# Patient Record
Sex: FEMALE | Race: WHITE | URBAN - METROPOLITAN AREA
[De-identification: names, ages, dates, MRNs, and addresses within clinical notes are randomized per-mention and may not be internally consistent; named-entity substitution may affect disease eponyms.]

---

## 2017-03-17 DIAGNOSIS — H92.10 EAR DRAINAGE, UNSPECIFIED LATERALITY: Primary | ICD-10-CM

## 2017-03-17 RX ORDER — CIPROFLOXACIN AND DEXAMETHASONE 3; 1 MG/ML; MG/ML
SUSPENSION/ DROPS AURICULAR (OTIC)
Qty: 7.5 ML | Refills: 0 | Status: SHIPPED | OUTPATIENT
Start: 2017-03-17 | End: 2017-03-27

## 2017-04-11 ENCOUNTER — OFFICE VISIT (OUTPATIENT)
Dept: OTOLARYNGOLOGY | Facility: CLINIC | Age: 57
End: 2017-04-11

## 2017-04-11 VITALS — BODY MASS INDEX: 24.33 KG/M2 | WEIGHT: 155 LBS | HEIGHT: 67 IN

## 2017-04-11 DIAGNOSIS — H90.6 MIXED HEARING LOSS, BILATERAL: Primary | ICD-10-CM

## 2017-04-11 DIAGNOSIS — H74.13: ICD-10-CM

## 2017-04-11 RX ORDER — CIPROFLOXACIN AND DEXAMETHASONE 3; 1 MG/ML; MG/ML
SUSPENSION/ DROPS AURICULAR (OTIC)
Qty: 7.5 ML | Refills: 0 | Status: SHIPPED | OUTPATIENT
Start: 2017-04-11 | End: 2017-04-21

## 2017-04-11 RX ORDER — CIPROFLOXACIN AND DEXAMETHASONE 3; 1 MG/ML; MG/ML
4 SUSPENSION/ DROPS AURICULAR (OTIC) 2 TIMES DAILY
COMMUNITY

## 2017-04-11 RX ORDER — FLUTICASONE PROPIONATE 50 MCG
2 SPRAY, SUSPENSION (ML) NASAL DAILY
Qty: 1 BOTTLE | Refills: 6 | Status: SHIPPED | OUTPATIENT
Start: 2017-04-11 | End: 2017-05-11

## 2017-04-11 ASSESSMENT — PAIN SCALES - GENERAL: PAINLEVEL: NO PAIN (0)

## 2017-04-11 NOTE — LETTER
4/11/2017       RE: Ora Sadler  [address] [facility] [address]     Dear Colleague,    Thank you for referring your patient, Ora Sadler, to the [facility] EAR NO[facility] AND THROAT at [facility]. Please see a copy of my visit note below.    Dear Dr. Enciso Ref-Primary, Physician:    I had the pleasure of seeing Ora Sadler in followup today at the [facility].    HISTORY OF PRESENT ILLNESS: The patient is a 56-year-old in today for followup from her last visit in May of 2016.  I have been seeing her since 2000 with bilateral cavities from bilateral ear surgeries performed in [place].  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  She has had bilateral cavities.  She comes in for cleaning.  She says she had a recent viral URI in March.  A little bit of discharge on the left with some sore throat.  She was seen in urgent care and given an antibiotic.  Hearing seems muffled.  She is moving back to [place] in the summer.  She feels the hearing is stable, worse on the right.  She does have hearing aids.  There has been no active drainage.         MEDICATIONS: Please refer to the detailed medication reconciliation performed by my nurse today, which I have reviewed and signed.     ALLERGIES:    Allergies   Allergen Reactions     Penicillins        HABITS:   Alcohol use Yes 4.2 oz/week    History   Smoking Status     Never Smoker   Smokeless Tobacco     Never Used         PAST MEDICAL HISTORY:  Please see today's intake form (for the remainder of the PMH) which I reviewed and signed.  Past Medical History:   Diagnosis Date     Conductive hearing loss      Hearing problem        FAMILY HISTORY/SOCIAL HISTORY:    Family History   Problem Relation Age of Onset     CANCER Mother      HEART DISEASE Mother      Hypertension Mother      DIABETES Paternal Grandmother      HEART DISEASE Paternal Grandmother       Hypertension Paternal Grandmother      HEART DISEASE Father     Social History     Social History     Marital status:      Spouse name: N/A     Number of children: N/A     Years of education: N/A     Occupational History     Not on file.     Social History Main Topics     Smoking status: Never Smoker     Smokeless tobacco: Never Used     Alcohol use 4.2 oz/week     Drug use: No     Sexual activity: No     Other Topics Concern     Not on file     Social History Narrative       REVIEW OF SYSTEMS: Please see today's intake form (for the remainder of the ROS) which I have reviewed and signed.    PHYSICIAL EXAMINATION:  Constitutional: The patient was well-groomed and in no acute distress.   Skin: Warm and pink.  Psychiatric: The patient's affect was calm, cooperative, and appropriate.   Respiratory: Breathing comfortably without stridor or exertion of accessory muscles.  Eyes: Pupils were equal and reactive. Extraocular movement intact.   Head: Normocephalic and atraumatic. No lesions or scars.  Ears: Both ears are examined with the microscope, curette, and a right angled hook.  Right side initially was cleaned using all instruments as well as an alligator.  After removal, the tympanic membrane area looks adhesive with the cavity well visualized and stable.  No evidence for recurrent cholesteatoma.  Left side had a lot of crusting which was mobilized with a right hook and removed with an alligator and curette.  The cavity again well visualized with stable tympanic membrane.   Nose: Sinuses were nontender. Anterior rhinoscopy revealed midline septum and absence of purulence or polyps.  Oral Cavity: Normal tongue, floor of moth, buccal mucosa, and palate. No abnormal lymph tissue in the oropharynx.   Neck: The parotid is soft without masses. Supple with normal laryngeal and tracheal landmarks.   Lymphatic: There is no palpable lymphadenopathy or other masses in the neck.   Neurologic: Alert and oriented x 3.  Cranial nerves III-XI within normal limits. Voice quality normal.  Cerebellar Function Tests:  Grossly normal    Audiogram:  None today.  Subjectively she feels fairly stable.         IMPRESSION AND PLAN: I talked with her for some time and ears look stable.  She is moving to [place] and I have given her the name of Dr. Giang if she would like to see an ear specialist there.  I would be happy to see her back here, she does have children in town.         Thank you very much for the opportunity to participate in the care of your patient.    Rick L Nissen MD

## 2017-04-11 NOTE — MR AVS SNAPSHOT
After Visit Summary   4/11/2017    Ora Sadler    MRN: [id number]           Patient Information     Date Of Birth          1960        Visit Information        Provider Department      4/11/2017 10:15 AM Nissen, Rick L, MD [facility]        Today's Diagnoses     CSOM (chronic suppurative otitis media)    -  1      Care Instructions    Two prescriptions were sent to the [facility] [place] [facility] in [facility], [place].     Thank you.        Follow-ups after your visit        Who to contact     Please call your clinic at [phone] to:    Ask questions about your health    Make or cancel appointments    Discuss your medicines    Learn about your test results    Speak to your doctor   If you have compliments or concerns about an experience at your clinic, or if you wish to file a complaint, please contact [facility] Patient Relations at [phone] or email us at [email or web address]         Additional Information About Your Visit        MyChart Information     [facility] gives you secure access to your electronic health record. If you see a primary care provider, you can also send messages to your care team and make appointments. If you have questions, please call your primary care clinic.  If you do not have a primary care provider, please call [phone] and they will assist you.      [employer] is an electronic gateway that provides easy, online access to your medical records. With [employer], you can request a clinic appointment, read your test results, renew a prescription or communicate with your care team.     To access your existing account, please contact your [facility] or call [phone] for assistance.        Care EveryWhere ID     This is your Care EveryWhere ID. This could be used by other organizations to access your [facility] medical records  [id number]        Your Vitals Were     Height BMI (Body Mass Index)     "            1.71 m (5' 7.32\") 24.04 kg/m2           Blood Pressure from Last 3 Encounters:   No data found for BP    Weight from Last 3 Encounters:   04/11/17 70.3 kg (155 lb)              Today, you had the following     No orders found for display         Today's Medication Changes          These changes are accurate as of: 4/11/17 10:45 AM.  If you have any questions, ask your nurse or doctor.               Start taking these medicines.        Dose/Directions    fluticasone 50 MCG/ACT spray   Commonly known as:  FLONASE   Used for:  CSOM (chronic suppurative otitis media)   Started by:  Nissen, Rick L, MD        Dose:  2 spray   Spray 2 sprays into both nostrils daily   Quantity:  1 Bottle   Refills:  6         These medicines have changed or have updated prescriptions.        Dose/Directions    * ciprofloxacin-dexamethasone otic suspension   Commonly known as:  CIPRODEX   This may have changed:  Another medication with the same name was added. Make sure you understand how and when to take each.   Changed by:  Nissen, Rick L, MD        Dose:  4 drop   4 drops 2 times daily   Refills:  0       * ciprofloxacin-dexamethasone otic suspension   Commonly known as:  CIPRODEX   This may have changed:  You were already taking a medication with the same name, and this prescription was added. Make sure you understand how and when to take each.   Used for:  CSOM (chronic suppurative otitis media)   Changed by:  Nissen, Rick L, MD        Instill 3gtts into bilateral ears TID x 3 days, then PRN   Quantity:  7.5 mL   Refills:  0       * Notice:  This list has 2 medication(s) that are the same as other medications prescribed for you. Read the directions carefully, and ask your doctor or other care provider to review them with you.         Where to get your medicines      These medications were sent to [facility] [id number] [facility] [place] [address], [place] [address]     Phone:  [phone]     " ciprofloxacin-dexamethasone otic suspension    fluticasone 50 MCG/ACT spray                Primary Care Provider    Physician No Ref-Primary       No address on file        Thank you!     Thank you for choosing [facility] EAR NOSE [facility] TH[facility]AT  for your care. Our goal is always to provide you with excellent care. Hearing back from our patients is one way we can continue to improve our services. Please take a few minutes to complete the written survey that you may receive in the mail after your visit with us. Thank you!             Your Updated Medication List - Protect others around you: Learn how to safely use, store and throw away your medicines at www.disposemymeds.org.          This list is accurate as of: 4/11/17 10:45 AM.  Always use your most recent med list.                   Brand Name Dispense Instructions for use    * ciprofloxacin-dexamethasone otic suspension    CIPRODEX     4 drops 2 times daily       * ciprofloxacin-dexamethasone otic suspension    CIPRODEX    7.5 mL    Instill 3gtts into bilateral ears TID x 3 days, then PRN       fluticasone 50 MCG/ACT spray    FLONASE    1 Bottle    Spray 2 sprays into both nostrils daily       * Notice:  This list has 2 medication(s) that are the same as other medications prescribed for you. Read the directions carefully, and ask your doctor or other care provider to review them with you.

## 2017-04-11 NOTE — PROGRESS NOTES
Dear Dr. Enciso Ref-Primary, Physician:    I had the pleasure of seeing Ora Sadler in followup today at the [facility].    HISTORY OF PRESENT ILLNESS: The patient is a 56-year-old in today for followup from her last visit in May of 2016.  I have been seeing her since 2000 with bilateral cavities from bilateral ear surgeries performed in [place].  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  She has had bilateral cavities.  She comes in for cleaning.  She says she had a recent viral URI in March.  A little bit of discharge on the left with some sore throat.  She was seen in urgent care and given an antibiotic.  Hearing seems muffled.  She is moving back to [place] in the summer.  She feels the hearing is stable, worse on the right.  She does have hearing aids.  There has been no active drainage.         MEDICATIONS: Please refer to the detailed medication reconciliation performed by my nurse today, which I have reviewed and signed.     ALLERGIES:    Allergies   Allergen Reactions     Penicillins        HABITS:   Alcohol use Yes 4.2 oz/week    History   Smoking Status     Never Smoker   Smokeless Tobacco     Never Used         PAST MEDICAL HISTORY:  Please see today's intake form (for the remainder of the PMH) which I reviewed and signed.  Past Medical History:   Diagnosis Date     Conductive hearing loss      Hearing problem        FAMILY HISTORY/SOCIAL HISTORY:    Family History   Problem Relation Age of Onset     CANCER Mother      HEART DISEASE Mother      Hypertension Mother      DIABETES Paternal Grandmother      HEART DISEASE Paternal Grandmother      Hypertension Paternal Grandmother      HEART DISEASE Father     Social History     Social History     Marital status:      Spouse name: N/A     Number of children: N/A     Years of education: N/A     Occupational History     Not on file.     Social History Main Topics     Smoking  status: Never Smoker     Smokeless tobacco: Never Used     Alcohol use 4.2 oz/week     Drug use: No     Sexual activity: No     Other Topics Concern     Not on file     Social History Narrative       REVIEW OF SYSTEMS: Please see today's intake form (for the remainder of the ROS) which I have reviewed and signed.    PHYSICIAL EXAMINATION:  Constitutional: The patient was well-groomed and in no acute distress.   Skin: Warm and pink.  Psychiatric: The patient's affect was calm, cooperative, and appropriate.   Respiratory: Breathing comfortably without stridor or exertion of accessory muscles.  Eyes: Pupils were equal and reactive. Extraocular movement intact.   Head: Normocephalic and atraumatic. No lesions or scars.  Ears: Both ears are examined with the microscope, curette, and a right angled hook.  Right side initially was cleaned using all instruments as well as an alligator.  After removal, the tympanic membrane area looks adhesive with the cavity well visualized and stable.  No evidence for recurrent cholesteatoma.  Left side had a lot of crusting which was mobilized with a right hook and removed with an alligator and curette.  The cavity again well visualized with stable tympanic membrane.   Nose: Sinuses were nontender. Anterior rhinoscopy revealed midline septum and absence of purulence or polyps.  Oral Cavity: Normal tongue, floor of moth, buccal mucosa, and palate. No abnormal lymph tissue in the oropharynx.   Neck: The parotid is soft without masses. Supple with normal laryngeal and tracheal landmarks.   Lymphatic: There is no palpable lymphadenopathy or other masses in the neck.   Neurologic: Alert and oriented x 3. Cranial nerves III-XI within normal limits. Voice quality normal.  Cerebellar Function Tests:  Grossly normal    Audiogram:  None today.  Subjectively she feels fairly stable.         IMPRESSION AND PLAN: I talked with her for some time and ears look stable.  She is moving to [place] and I  have given her the name of Dr. Giang if she would like to see an ear specialist there.  I would be happy to see her back here, she does have children in town.         Thank you very much for the opportunity to participate in the care of your patient.    Rick L Nissen MD

## 2017-08-19 ENCOUNTER — HEALTH MAINTENANCE LETTER (OUTPATIENT)
Age: 57
End: 2017-08-19

## 2019-11-06 ENCOUNTER — HEALTH MAINTENANCE LETTER (OUTPATIENT)
Age: 59
End: 2019-11-06

## 2020-11-29 ENCOUNTER — HEALTH MAINTENANCE LETTER (OUTPATIENT)
Age: 60
End: 2020-11-29

## 2021-09-19 ENCOUNTER — HEALTH MAINTENANCE LETTER (OUTPATIENT)
Age: 61
End: 2021-09-19

## 2021-11-14 ENCOUNTER — HEALTH MAINTENANCE LETTER (OUTPATIENT)
Age: 61
End: 2021-11-14

## 2022-01-09 ENCOUNTER — HEALTH MAINTENANCE LETTER (OUTPATIENT)
Age: 62
End: 2022-01-09

## 2022-11-21 ENCOUNTER — HEALTH MAINTENANCE LETTER (OUTPATIENT)
Age: 62
End: 2022-11-21

## 2023-04-16 ENCOUNTER — HEALTH MAINTENANCE LETTER (OUTPATIENT)
Age: 63
End: 2023-04-16

## 2023-11-26 ENCOUNTER — HEALTH MAINTENANCE LETTER (OUTPATIENT)
Age: 63
End: 2023-11-26